# Patient Record
Sex: MALE | Race: ASIAN | NOT HISPANIC OR LATINO | Employment: UNEMPLOYED | URBAN - METROPOLITAN AREA
[De-identification: names, ages, dates, MRNs, and addresses within clinical notes are randomized per-mention and may not be internally consistent; named-entity substitution may affect disease eponyms.]

---

## 2020-02-03 ENCOUNTER — OFFICE VISIT (OUTPATIENT)
Dept: FAMILY MEDICINE CLINIC | Facility: CLINIC | Age: 30
End: 2020-02-03

## 2020-02-03 VITALS
HEART RATE: 94 BPM | WEIGHT: 166 LBS | BODY MASS INDEX: 23.77 KG/M2 | OXYGEN SATURATION: 99 % | DIASTOLIC BLOOD PRESSURE: 76 MMHG | SYSTOLIC BLOOD PRESSURE: 117 MMHG | HEIGHT: 70 IN

## 2020-02-03 DIAGNOSIS — R10.12 LEFT UPPER QUADRANT PAIN: Primary | ICD-10-CM

## 2020-02-03 DIAGNOSIS — Z13.6 SCREENING FOR CARDIOVASCULAR CONDITION: ICD-10-CM

## 2020-02-03 PROCEDURE — 99203 OFFICE O/P NEW LOW 30 MIN: CPT | Performed by: INTERNAL MEDICINE

## 2020-02-04 ENCOUNTER — LAB (OUTPATIENT)
Dept: LAB | Facility: HOSPITAL | Age: 30
End: 2020-02-04

## 2020-02-04 DIAGNOSIS — Z13.6 SCREENING FOR CARDIOVASCULAR CONDITION: ICD-10-CM

## 2020-02-04 DIAGNOSIS — R10.12 LEFT UPPER QUADRANT PAIN: ICD-10-CM

## 2020-02-04 LAB
ALBUMIN SERPL-MCNC: 4.4 G/DL (ref 3.5–5.2)
ALBUMIN/GLOB SERPL: 1.5 G/DL
ALP SERPL-CCNC: 55 U/L (ref 39–117)
ALT SERPL W P-5'-P-CCNC: 15 U/L (ref 1–41)
ANION GAP SERPL CALCULATED.3IONS-SCNC: 12.7 MMOL/L (ref 5–15)
AST SERPL-CCNC: 16 U/L (ref 1–40)
BASOPHILS # BLD AUTO: 0.05 10*3/MM3 (ref 0–0.2)
BASOPHILS NFR BLD AUTO: 0.9 % (ref 0–1.5)
BILIRUB SERPL-MCNC: 0.8 MG/DL (ref 0.2–1.2)
BUN BLD-MCNC: 14 MG/DL (ref 6–20)
BUN/CREAT SERPL: 13.9 (ref 7–25)
CALCIUM SPEC-SCNC: 9.5 MG/DL (ref 8.6–10.5)
CHLORIDE SERPL-SCNC: 101 MMOL/L (ref 98–107)
CHOLEST SERPL-MCNC: 152 MG/DL (ref 0–200)
CO2 SERPL-SCNC: 28.3 MMOL/L (ref 22–29)
CREAT BLD-MCNC: 1.01 MG/DL (ref 0.76–1.27)
DEPRECATED RDW RBC AUTO: 37.1 FL (ref 37–54)
EOSINOPHIL # BLD AUTO: 0.26 10*3/MM3 (ref 0–0.4)
EOSINOPHIL NFR BLD AUTO: 4.6 % (ref 0.3–6.2)
ERYTHROCYTE [DISTWIDTH] IN BLOOD BY AUTOMATED COUNT: 11.9 % (ref 12.3–15.4)
GFR SERPL CREATININE-BSD FRML MDRD: 106 ML/MIN/1.73
GFR SERPL CREATININE-BSD FRML MDRD: 87 ML/MIN/1.73
GLOBULIN UR ELPH-MCNC: 2.9 GM/DL
GLUCOSE BLD-MCNC: 79 MG/DL (ref 65–99)
HCT VFR BLD AUTO: 44.8 % (ref 37.5–51)
HDLC SERPL-MCNC: 36 MG/DL (ref 40–60)
HGB BLD-MCNC: 15.4 G/DL (ref 13–17.7)
IMM GRANULOCYTES # BLD AUTO: 0.01 10*3/MM3 (ref 0–0.05)
IMM GRANULOCYTES NFR BLD AUTO: 0.2 % (ref 0–0.5)
LDLC SERPL CALC-MCNC: 104 MG/DL (ref 0–100)
LDLC/HDLC SERPL: 2.89 {RATIO}
LIPASE SERPL-CCNC: 33 U/L (ref 13–60)
LYMPHOCYTES # BLD AUTO: 2.64 10*3/MM3 (ref 0.7–3.1)
LYMPHOCYTES NFR BLD AUTO: 46.3 % (ref 19.6–45.3)
MCH RBC QN AUTO: 29.3 PG (ref 26.6–33)
MCHC RBC AUTO-ENTMCNC: 34.4 G/DL (ref 31.5–35.7)
MCV RBC AUTO: 85.2 FL (ref 79–97)
MONOCYTES # BLD AUTO: 0.45 10*3/MM3 (ref 0.1–0.9)
MONOCYTES NFR BLD AUTO: 7.9 % (ref 5–12)
NEUTROPHILS # BLD AUTO: 2.29 10*3/MM3 (ref 1.7–7)
NEUTROPHILS NFR BLD AUTO: 40.1 % (ref 42.7–76)
NRBC BLD AUTO-RTO: 0 /100 WBC (ref 0–0.2)
PLATELET # BLD AUTO: 251 10*3/MM3 (ref 140–450)
PMV BLD AUTO: 10.1 FL (ref 6–12)
POTASSIUM BLD-SCNC: 4.3 MMOL/L (ref 3.5–5.2)
PROT SERPL-MCNC: 7.3 G/DL (ref 6–8.5)
RBC # BLD AUTO: 5.26 10*6/MM3 (ref 4.14–5.8)
SODIUM BLD-SCNC: 142 MMOL/L (ref 136–145)
TRIGL SERPL-MCNC: 60 MG/DL (ref 0–150)
VLDLC SERPL-MCNC: 12 MG/DL (ref 5–40)
WBC NRBC COR # BLD: 5.7 10*3/MM3 (ref 3.4–10.8)

## 2020-02-04 PROCEDURE — 83690 ASSAY OF LIPASE: CPT

## 2020-02-04 PROCEDURE — 80053 COMPREHEN METABOLIC PANEL: CPT

## 2020-02-04 PROCEDURE — 36415 COLL VENOUS BLD VENIPUNCTURE: CPT

## 2020-02-04 PROCEDURE — 83013 H PYLORI (C-13) BREATH: CPT

## 2020-02-04 PROCEDURE — 80061 LIPID PANEL: CPT

## 2020-02-04 PROCEDURE — 85025 COMPLETE CBC W/AUTO DIFF WBC: CPT

## 2020-02-04 NOTE — PROGRESS NOTES
Chief Complaint   Patient presents with   • GI Problem     sharp pain in abdomen x 1 month - LUQ . Has tried omeprazole - pain still comes back after eating.    • Establish Care     prefers prescriptions to be printed        HPI:  Raine Diana is a 29 y.o. male who presents today for left upper quadrant pain for the last month.  Pain started as a sharp stabbing sensation worse after meals left upper abdomen.  No changes in bowel movements.  He reports he started taking omeprazole daily which did significantly improve his symptoms.  He still intermittently has pain after some meals.  He is requesting an EGD today.  He denies any past medical history.    ROS:  Constitutional: no fevers, night sweats or unexplained weight loss  Eyes: no vision changes  ENT: no runny nose, ear pain, sore throat  Cardio: no chest pain, palpitations  Pulm: no shortness of breath, wheezing, or cough  GI: + abdominal pain - changes in bowel movements  : no difficulty urinating  MSK: no difficulty ambulating, no joint pain  Neuro: no weakness, dizziness or headache  Psych: no trouble sleeping  Endo: no change in appetite      History reviewed. No pertinent past medical history.   History reviewed. No pertinent family history.   Social History     Socioeconomic History   • Marital status: Single     Spouse name: Not on file   • Number of children: Not on file   • Years of education: Not on file   • Highest education level: Not on file   Tobacco Use   • Smoking status: Never Smoker   • Smokeless tobacco: Never Used   Substance and Sexual Activity   • Alcohol use: Not Currently   • Drug use: Never      No Known Allergies     There is no immunization history on file for this patient.     PE:  Vitals:    02/03/20 1531   BP: 117/76   Pulse: 94   SpO2: 99%      Body mass index is 23.82 kg/m².    Gen Appearance: NAD  HEENT: Normocephalic, PERRLA, no thyromegaly, trache midline  Heart: RRR, normal S1 and S2, no murmur  Lungs: CTA b/l, no wheezing, no  crackles  Abdomen: Soft, non-tender, non-distended, no guarding and BSx4  MSK: Moves all extremities well, normal gait, no peripheral edema  Pulses: Palpable and equal b/l  Lymph nodes: No palpable lymphadenopathy   Neuro: No focal deficits      No current outpatient medications on file.     No current facility-administered medications for this visit.         Raine was seen today for gi problem and establish care.    Diagnoses and all orders for this visit:    Left upper quadrant pain  -     CBC & Differential; Future  -     Comprehensive Metabolic Panel; Future  -     H. Pylori Breath Test - Breath, Lung; Future  -     Lipase; Future  -     Ambulatory referral for Screening EGD  New problem, H. pylori versus peptic ulcer.  Continue omeprazole.  Checking H. pylori today although false-negative rate will be high with concurrent PPI use.  Sending for EGD for further evaluation.  Checking screening blood work.  Screening for cardiovascular condition  -     Lipid Panel; Future         Return in about 4 weeks (around 3/2/2020) for Annual.     Please note that portions of this document were completed with a voice recognition program. Efforts were made to edit the dictations, but occasionally words are mis-transcribed.

## 2020-02-05 LAB — UREA BREATH TEST QL: POSITIVE

## 2020-02-06 ENCOUNTER — TELEPHONE (OUTPATIENT)
Dept: FAMILY MEDICINE CLINIC | Facility: CLINIC | Age: 30
End: 2020-02-06

## 2020-02-06 DIAGNOSIS — A04.8 H. PYLORI INFECTION: Primary | ICD-10-CM

## 2020-02-06 RX ORDER — METRONIDAZOLE 500 MG/1
500 TABLET ORAL 3 TIMES DAILY
Qty: 42 TABLET | Refills: 0 | Status: SHIPPED | OUTPATIENT
Start: 2020-02-06 | End: 2020-02-20

## 2020-02-06 RX ORDER — DOXYCYCLINE HYCLATE 100 MG
100 TABLET ORAL 2 TIMES DAILY
Qty: 28 TABLET | Refills: 0 | Status: SHIPPED | OUTPATIENT
Start: 2020-02-06 | End: 2020-02-20

## 2020-02-06 RX ORDER — PANTOPRAZOLE SODIUM 40 MG/1
40 TABLET, DELAYED RELEASE ORAL 2 TIMES DAILY
Qty: 28 TABLET | Refills: 0 | Status: SHIPPED | OUTPATIENT
Start: 2020-02-06 | End: 2020-02-20

## 2020-02-06 NOTE — TELEPHONE ENCOUNTER
Called patient, no answer, left voicemail.  He is positive for H. pylori.  He has several new prescriptions he needs to come .  We will need to postpone EGD until after treatment.

## 2020-02-07 NOTE — TELEPHONE ENCOUNTER
Notified patient of his lab results and explained he has some scripts to  for the h pylori infection. The patient verbalized understanding and will come by to pick these up.

## 2020-02-07 NOTE — TELEPHONE ENCOUNTER
Yakov Garcia RegSched Rep 23 hours ago (1:09 PM)         PT CALLED IN REQUESTING A CB TO DISCUSS TEST RESULTS        PT CB NUMBER: [id number]

## 2020-02-25 ENCOUNTER — TELEPHONE (OUTPATIENT)
Dept: FAMILY MEDICINE CLINIC | Facility: CLINIC | Age: 30
End: 2020-02-25

## 2020-02-25 ENCOUNTER — OFFICE VISIT (OUTPATIENT)
Dept: FAMILY MEDICINE CLINIC | Facility: CLINIC | Age: 30
End: 2020-02-25

## 2020-02-25 VITALS
OXYGEN SATURATION: 98 % | SYSTOLIC BLOOD PRESSURE: 120 MMHG | DIASTOLIC BLOOD PRESSURE: 66 MMHG | BODY MASS INDEX: 23.77 KG/M2 | WEIGHT: 166 LBS | HEIGHT: 70 IN | HEART RATE: 68 BPM | TEMPERATURE: 98 F

## 2020-02-25 DIAGNOSIS — K21.9 GASTROESOPHAGEAL REFLUX DISEASE, ESOPHAGITIS PRESENCE NOT SPECIFIED: ICD-10-CM

## 2020-02-25 DIAGNOSIS — A04.8 H. PYLORI INFECTION: ICD-10-CM

## 2020-02-25 DIAGNOSIS — B37.0 THRUSH: ICD-10-CM

## 2020-02-25 DIAGNOSIS — Z20.828 EXPOSURE TO THE FLU: Primary | ICD-10-CM

## 2020-02-25 PROCEDURE — 99214 OFFICE O/P EST MOD 30 MIN: CPT | Performed by: INTERNAL MEDICINE

## 2020-02-25 RX ORDER — FLUCONAZOLE 100 MG/1
100 TABLET ORAL DAILY
Qty: 11 TABLET | Refills: 0 | Status: SHIPPED | OUTPATIENT
Start: 2020-02-25 | End: 2020-03-06

## 2020-02-25 RX ORDER — OMEPRAZOLE 20 MG/1
20 CAPSULE, DELAYED RELEASE ORAL DAILY
Qty: 30 CAPSULE | Refills: 2 | Status: SHIPPED | OUTPATIENT
Start: 2020-02-25 | End: 2020-05-26

## 2020-02-25 RX ORDER — OSELTAMIVIR PHOSPHATE 75 MG/1
75 CAPSULE ORAL DAILY
Qty: 7 CAPSULE | Refills: 0 | Status: SHIPPED | OUTPATIENT
Start: 2020-02-25 | End: 2020-03-03

## 2020-02-25 NOTE — TELEPHONE ENCOUNTER
[facility] pharmacy is requesting the directions for the Tamiflu are correct.  She says it says 1 capsule daily for 7 days.  She said they normally see it as 1 capsule twice a day for 10 days.  They are requesting call back for confirmation.

## 2020-02-25 NOTE — PROGRESS NOTES
Chief Complaint   Patient presents with   • Oral Pain     discoloration of tongue    • Influenza     Exposed to flu        HPI:  Raine Diana is a 29 y.o. male who presents today for follow-up.  H. pylori test patient reports he feels much better after taking antibiotics and additional medications.  He reports his GERD symptoms have returned after stopping antacid.  He reports daily symptoms at this time.  He reports a white film over his tongue after taking antibiotics.  He states he has lost some weight since being diagnosed with H. pylori.  He reports a 10 pound weight loss over the last month.  He was exposed to somebody with the flu recently although he does not have symptoms at this time.    ROS:  Constitutional: no fevers, night sweats or unexplained weight loss  Eyes: no vision changes  ENT: no runny nose, ear pain, sore throat  Cardio: no chest pain, palpitations  Pulm: no shortness of breath, wheezing, or cough  GI: no abdominal pain or changes in bowel movements  : no difficulty urinating  MSK: no difficulty ambulating, no joint pain  Neuro: no weakness, dizziness or headache  Psych: no trouble sleeping  Endo: no change in appetite      History reviewed. No pertinent past medical history.   History reviewed. No pertinent family history.   Social History     Socioeconomic History   • Marital status: Single     Spouse name: Not on file   • Number of children: Not on file   • Years of education: Not on file   • Highest education level: Not on file   Tobacco Use   • Smoking status: Never Smoker   • Smokeless tobacco: Never Used   Substance and Sexual Activity   • Alcohol use: Not Currently   • Drug use: Never      No Known Allergies     There is no immunization history on file for this patient.     PE:  Vitals:    02/25/20 1347   BP: 120/66   Pulse: 68   Temp: 98 °F (36.7 °C)   SpO2: 98%      Body mass index is 23.82 kg/m².    Gen Appearance: NAD  HEENT: Normocephalic, PERRLA, no thyromegaly, trache  midline  Heart: RRR, normal S1 and S2, no murmur  Lungs: CTA b/l, no wheezing, no crackles  Abdomen: Soft, non-tender, non-distended, no guarding and BSx4  MSK: Moves all extremities well, normal gait, no peripheral edema  Pulses: Palpable and equal b/l  Lymph nodes: No palpable lymphadenopathy   Neuro: No focal deficits      Current Outpatient Medications   Medication Sig Dispense Refill   • fluconazole (DIFLUCAN) 100 MG tablet Take 1 tablet by mouth Daily for 10 days. Take 2 tabs on the first day. 11 tablet 0   • omeprazole (PrilOSEC) 20 MG capsule Take 1 capsule by mouth Daily. 30 capsule 2   • oseltamivir (TAMIFLU) 75 MG capsule Take 1 capsule by mouth Daily for 7 days. 7 capsule 0     No current facility-administered medications for this visit.         Raine was seen today for oral pain and influenza.    Diagnoses and all orders for this visit:    Exposure to the flu  Sending in prophylactic dose Tamiflu.  Thrush  Fluconazole for the next 7 days.  H. pylori infection  Sending for EGD for further evaluation and biopsy results.  Discussed with patient that he could repeat H. pylori breath test after stopping omeprazole for 1 to 2 weeks.  He would like to proceed with EGD.  He does have concurrent weight loss although I suspect this is due to upset stomach and not eating as much.  Gastroesophageal reflux disease, esophagitis presence not specified  Resume omeprazole daily.  Send in refill today.  Other orders  -     fluconazole (DIFLUCAN) 100 MG tablet; Take 1 tablet by mouth Daily for 10 days. Take 2 tabs on the first day.  -     omeprazole (PrilOSEC) 20 MG capsule; Take 1 capsule by mouth Daily.  -     oseltamivir (TAMIFLU) 75 MG capsule; Take 1 capsule by mouth Daily for 7 days.         Return in about 1 year (around 2/25/2021) for Annual.     Please note that portions of this document were completed with a voice recognition program. Efforts were made to edit the dictations, but occasionally words are  mis-transcribed.

## 2020-04-03 DIAGNOSIS — A04.8 H. PYLORI INFECTION: Primary | ICD-10-CM

## 2020-04-09 ENCOUNTER — TELEMEDICINE (OUTPATIENT)
Dept: FAMILY MEDICINE CLINIC | Facility: CLINIC | Age: 30
End: 2020-04-09

## 2020-04-09 DIAGNOSIS — K59.03 DRUG INDUCED CONSTIPATION: ICD-10-CM

## 2020-04-09 DIAGNOSIS — K21.9 GASTROESOPHAGEAL REFLUX DISEASE, ESOPHAGITIS PRESENCE NOT SPECIFIED: Primary | ICD-10-CM

## 2020-04-09 DIAGNOSIS — Z86.19 HISTORY OF HELICOBACTER PYLORI INFECTION: ICD-10-CM

## 2020-04-09 DIAGNOSIS — K62.5 BRIGHT RED BLOOD PER RECTUM: ICD-10-CM

## 2020-04-09 PROCEDURE — 99214 OFFICE O/P EST MOD 30 MIN: CPT | Performed by: INTERNAL MEDICINE

## 2020-04-09 NOTE — PROGRESS NOTES
gerd      HPI:  Raine Diana is a 29 y.o. male who presents today for continued reflux symptoms.  Patient reports he stopped taking his antiacid medicine about a week ago and symptoms returned.  He was also taking supplemental vitamins which was causing worsening reflux.  He was fearful that the H. pylori has returned.  He resumed medication and symptoms have somewhat improved.  He is fearful of coming in to repeat the H. pylori breath test.  He reports the medication has caused constipation.  He has had some blood in stool over the last month.    ROS:  Constitutional: no fevers, night sweats or unexplained weight loss  Eyes: no vision changes  ENT: no runny nose, ear pain, sore throat  Cardio: no chest pain, palpitations  Pulm: no shortness of breath, wheezing, or cough  GI: + abdominal pain + changes in bowel movements  : no difficulty urinating  MSK: no difficulty ambulating, no joint pain  Neuro: no weakness, dizziness or headache  Psych: no trouble sleeping  Endo: no change in appetite      No past medical history on file.   No family history on file.   Social History     Socioeconomic History   • Marital status: Single     Spouse name: Not on file   • Number of children: Not on file   • Years of education: Not on file   • Highest education level: Not on file   Tobacco Use   • Smoking status: Never Smoker   • Smokeless tobacco: Never Used   Substance and Sexual Activity   • Alcohol use: Not Currently   • Drug use: Never      No Known Allergies     There is no immunization history on file for this patient.     PE:  There were no vitals filed for this visit.   There is no height or weight on file to calculate BMI.    Gen Appearance: NAD  HEENT: Normocephalic  MSK: Moves all extremities well, normal gait, no peripheral edema  Neuro: No focal deficits      Current Outpatient Medications   Medication Sig Dispense Refill   • omeprazole (PrilOSEC) 20 MG capsule Take 1 capsule by mouth Daily. 30 capsule 2     No  current facility-administered medications for this visit.         Diagnoses and all orders for this visit:    Gastroesophageal reflux disease, esophagitis presence not specified  Recommend continuing omeprazole.  Will need EGD if symptoms persist and for retesting purposes of H. pylori.  Symptoms are improving since restarting medicine.  History of Helicobacter pylori infection  See above.  Will need repeat testing.  Bright red blood per rectum  Possibly due to constipation.  Will work-up with scope if needed more persistent.  Unable to get in for routine scopes at this time due to coronavirus.  Drug-induced constipation  Recommend increasing water and fiber intake.  Docusate as needed.  Will need to continue antiacid at this time due to history of H. pylori and persistent symptoms.  No follow-ups on file.     Please note that portions of this document were completed with a voice recognition program. Efforts were made to edit the dictations, but occasionally words are mis-transcribed.

## 2020-04-23 ENCOUNTER — TELEMEDICINE (OUTPATIENT)
Dept: FAMILY MEDICINE CLINIC | Facility: CLINIC | Age: 30
End: 2020-04-23

## 2020-04-23 DIAGNOSIS — K59.03 DRUG-INDUCED CONSTIPATION: ICD-10-CM

## 2020-04-23 DIAGNOSIS — K27.9 PEPTIC ULCER DISEASE: ICD-10-CM

## 2020-04-23 DIAGNOSIS — A04.8 H. PYLORI INFECTION: Primary | ICD-10-CM

## 2020-04-23 PROCEDURE — 99214 OFFICE O/P EST MOD 30 MIN: CPT | Performed by: INTERNAL MEDICINE

## 2020-04-23 RX ORDER — SENNA AND DOCUSATE SODIUM 50; 8.6 MG/1; MG/1
1 TABLET, FILM COATED ORAL 2 TIMES DAILY
Qty: 60 TABLET | Refills: 2 | Status: SHIPPED | OUTPATIENT
Start: 2020-04-23

## 2020-04-23 RX ORDER — FAMOTIDINE 20 MG/1
20 TABLET, FILM COATED ORAL 2 TIMES DAILY
Qty: 60 TABLET | Refills: 2 | Status: SHIPPED | OUTPATIENT
Start: 2020-04-23 | End: 2020-04-23 | Stop reason: SDUPTHER

## 2020-04-23 RX ORDER — SENNA AND DOCUSATE SODIUM 50; 8.6 MG/1; MG/1
1 TABLET, FILM COATED ORAL 2 TIMES DAILY
Qty: 60 TABLET | Refills: 2 | Status: SHIPPED | OUTPATIENT
Start: 2020-04-23 | End: 2020-04-23 | Stop reason: SDUPTHER

## 2020-04-23 RX ORDER — FAMOTIDINE 20 MG/1
20 TABLET, FILM COATED ORAL 2 TIMES DAILY
Qty: 60 TABLET | Refills: 2 | Status: SHIPPED | OUTPATIENT
Start: 2020-04-23

## 2020-04-23 NOTE — PROGRESS NOTES
CC: abdominal pain  You have chosen to receive care through a telehealth visit.  Do you consent to use a video/audio connection for your medical care today? Yes      HPI:  Quang Diana is a 29 y.o. male who presents today for continued abdominal pain, now radiating to his back. He is anxious because he is unable to get an EGD right now due to coronavirus. He stopped taking omeprazole recently because of constipation. He was taking dulcolax for constipation but this was ineffective.     ROS:  Constitutional: no fevers, night sweats or unexplained weight loss  Eyes: no vision changes  ENT: no runny nose, ear pain, sore throat  Cardio: no chest pain, palpitations  Pulm: no shortness of breath, wheezing, or cough  GI: +abdominal pain +changes in bowel movements  : no difficulty urinating  MSK: no difficulty ambulating, no joint pain  Neuro: no weakness, dizziness or headache  Psych: no trouble sleeping  Endo: no change in appetite      History reviewed. No pertinent past medical history.   History reviewed. No pertinent family history.   Social History     Socioeconomic History   • Marital status: Single     Spouse name: Not on file   • Number of children: Not on file   • Years of education: Not on file   • Highest education level: Not on file   Tobacco Use   • Smoking status: Never Smoker   • Smokeless tobacco: Never Used   Substance and Sexual Activity   • Alcohol use: Not Currently   • Drug use: Never      No Known Allergies     There is no immunization history on file for this patient.     PE:  There were no vitals filed for this visit.   There is no height or weight on file to calculate BMI.    Gen Appearance: NAD  HEENT: Normocephalic  MSK: Moves all extremities well, normal gait, no peripheral edema  Neuro: No focal deficits      Current Outpatient Medications   Medication Sig Dispense Refill   • omeprazole (PrilOSEC) 20 MG capsule Take 1 capsule by mouth Daily. 30 capsule 2   • famotidine (Pepcid) 20 MG tablet  Take 1 tablet by mouth 2 (Two) Times a Day. 60 tablet 2   • senna-docusate sodium (SENOKOT-S) 8.6-50 MG tablet Take 1 tablet by mouth 2 (Two) Times a Day. 60 tablet 2     No current facility-administered medications for this visit.       10 min spent discussing with patient on video visit.      Diagnoses and all orders for this visit:    H. pylori infection  -     Comprehensive Metabolic Panel; Future  -     Lipase; Future  -     CBC & Differential; Future  Recommend repeating H pylori testing. He completed quadruple therapy, but had persistent symptoms afterwards. High likelihood of false negative with concurrent PPI use. Will ultimately need EGD.  Peptic ulcer disease  -     Comprehensive Metabolic Panel; Future  -     Lipase; Future  -     CBC & Differential; Future  Would recommend resuming omeprazole, add on docusate senna daily for constipation. If unable to tolerate will step down to pepcid.  Drug-induced constipation  See above.   Other orders  -     Discontinue: famotidine (Pepcid) 20 MG tablet; Take 1 tablet by mouth 2 (Two) Times a Day.  -     Discontinue: senna-docusate sodium (SENOKOT-S) 8.6-50 MG tablet; Take 1 tablet by mouth 2 (Two) Times a Day.         No follow-ups on file.     Please note that portions of this document were completed with a voice recognition program. Efforts were made to edit the dictations, but occasionally words are mis-transcribed.

## 2020-04-24 ENCOUNTER — LAB (OUTPATIENT)
Dept: LAB | Facility: HOSPITAL | Age: 30
End: 2020-04-24

## 2020-04-24 DIAGNOSIS — K27.9 PEPTIC ULCER DISEASE: ICD-10-CM

## 2020-04-24 DIAGNOSIS — A04.8 H. PYLORI INFECTION: ICD-10-CM

## 2020-04-24 LAB
ALBUMIN SERPL-MCNC: 4.5 G/DL (ref 3.5–5.2)
ALBUMIN/GLOB SERPL: 1.3 G/DL
ALP SERPL-CCNC: 62 U/L (ref 39–117)
ALT SERPL W P-5'-P-CCNC: 39 U/L (ref 1–41)
ANION GAP SERPL CALCULATED.3IONS-SCNC: 13.6 MMOL/L (ref 5–15)
AST SERPL-CCNC: 19 U/L (ref 1–40)
BASOPHILS # BLD AUTO: 0.02 10*3/MM3 (ref 0–0.2)
BASOPHILS NFR BLD AUTO: 0.4 % (ref 0–1.5)
BILIRUB SERPL-MCNC: 1.1 MG/DL (ref 0.2–1.2)
BUN BLD-MCNC: 15 MG/DL (ref 6–20)
BUN/CREAT SERPL: 17 (ref 7–25)
CALCIUM SPEC-SCNC: 9.5 MG/DL (ref 8.6–10.5)
CHLORIDE SERPL-SCNC: 98 MMOL/L (ref 98–107)
CO2 SERPL-SCNC: 26.4 MMOL/L (ref 22–29)
CREAT BLD-MCNC: 0.88 MG/DL (ref 0.76–1.27)
DEPRECATED RDW RBC AUTO: 39.1 FL (ref 37–54)
EOSINOPHIL # BLD AUTO: 0.24 10*3/MM3 (ref 0–0.4)
EOSINOPHIL NFR BLD AUTO: 4.5 % (ref 0.3–6.2)
ERYTHROCYTE [DISTWIDTH] IN BLOOD BY AUTOMATED COUNT: 12.3 % (ref 12.3–15.4)
GFR SERPL CREATININE-BSD FRML MDRD: 102 ML/MIN/1.73
GFR SERPL CREATININE-BSD FRML MDRD: 124 ML/MIN/1.73
GLOBULIN UR ELPH-MCNC: 3.4 GM/DL
GLUCOSE BLD-MCNC: 84 MG/DL (ref 65–99)
HCT VFR BLD AUTO: 46.2 % (ref 37.5–51)
HGB BLD-MCNC: 15.4 G/DL (ref 13–17.7)
IMM GRANULOCYTES # BLD AUTO: 0 10*3/MM3 (ref 0–0.05)
IMM GRANULOCYTES NFR BLD AUTO: 0 % (ref 0–0.5)
LIPASE SERPL-CCNC: 31 U/L (ref 13–60)
LYMPHOCYTES # BLD AUTO: 2.45 10*3/MM3 (ref 0.7–3.1)
LYMPHOCYTES NFR BLD AUTO: 46.3 % (ref 19.6–45.3)
MCH RBC QN AUTO: 28.9 PG (ref 26.6–33)
MCHC RBC AUTO-ENTMCNC: 33.3 G/DL (ref 31.5–35.7)
MCV RBC AUTO: 86.7 FL (ref 79–97)
MONOCYTES # BLD AUTO: 0.39 10*3/MM3 (ref 0.1–0.9)
MONOCYTES NFR BLD AUTO: 7.4 % (ref 5–12)
NEUTROPHILS # BLD AUTO: 2.19 10*3/MM3 (ref 1.7–7)
NEUTROPHILS NFR BLD AUTO: 41.4 % (ref 42.7–76)
NRBC BLD AUTO-RTO: 0.2 /100 WBC (ref 0–0.2)
PLATELET # BLD AUTO: 252 10*3/MM3 (ref 140–450)
PMV BLD AUTO: 10.3 FL (ref 6–12)
POTASSIUM BLD-SCNC: 3.8 MMOL/L (ref 3.5–5.2)
PROT SERPL-MCNC: 7.9 G/DL (ref 6–8.5)
RBC # BLD AUTO: 5.33 10*6/MM3 (ref 4.14–5.8)
SODIUM BLD-SCNC: 138 MMOL/L (ref 136–145)
WBC NRBC COR # BLD: 5.29 10*3/MM3 (ref 3.4–10.8)

## 2020-04-24 PROCEDURE — 83013 H PYLORI (C-13) BREATH: CPT

## 2020-04-24 PROCEDURE — 80053 COMPREHEN METABOLIC PANEL: CPT

## 2020-04-24 PROCEDURE — 83690 ASSAY OF LIPASE: CPT

## 2020-04-24 PROCEDURE — 85025 COMPLETE CBC W/AUTO DIFF WBC: CPT

## 2020-04-25 LAB — UREA BREATH TEST QL: NEGATIVE

## 2020-05-04 ENCOUNTER — TELEMEDICINE (OUTPATIENT)
Dept: GASTROENTEROLOGY | Facility: CLINIC | Age: 30
End: 2020-05-04

## 2020-05-04 DIAGNOSIS — R10.13 DYSPEPSIA: ICD-10-CM

## 2020-05-04 DIAGNOSIS — R10.13 EPIGASTRIC PAIN: Primary | ICD-10-CM

## 2020-05-04 DIAGNOSIS — K29.70 HELICOBACTER PYLORI GASTRITIS: ICD-10-CM

## 2020-05-04 DIAGNOSIS — B96.81 HELICOBACTER PYLORI GASTRITIS: ICD-10-CM

## 2020-05-04 PROCEDURE — 99203 OFFICE O/P NEW LOW 30 MIN: CPT | Performed by: INTERNAL MEDICINE

## 2020-05-04 NOTE — PROGRESS NOTES
PCP: Braulio Garcia DO    No chief complaint on file.  History of H. pylori.  History of upper abdominal pain.    History of Present Illness:   HPI  This was a video visit.  Patient consented for video visit.  I appreciate this consult for upper abdominal pain and history of H. Pylori.  Mr. Diana is a 29-year-old with a history of H. pylori diagnosed back in February.  This was diagnosed with a breath test and the patient took medication with resolution of his symptoms.  He was experiencing that time some upper abdominal pain that was described as burning without radiation.  Mr. Diana states the pain did increase with meals.  There was no vomiting but he did have some nausea.  The patient initially lost about 20 pounds per report.  There is no history of difficult or painful swallowing.  The patient denies any breakthrough heartburn.  The symptoms began in January prior to being treated in February.  He began having some symptoms again in early April.  The patient stopped the omeprazole medication because it was causing some constipation.  He did however do another breath test that was negative.  Mr. Diana admits that the test was performed while he was on omeprazole.  Mr. Diana states that over the last couple of weeks he has not experienced any abdominal pain after starting the omeprazole again.  The patient has regular bowel habits without signs of blood in the stool.  He denies any difficult or painful evacuation.  There is no history of change in the caliber stool.  Mr. Diana states there is no history in the family of gastrointestinal cancer.  There is no known history of liver disease.  He denies taking any nonsteroidal medication.  The patient does not smoke cigarettes.  No past medical history on file.    Past Surgical History:   Procedure Laterality Date   • HAND SURGERY Right 2019         Current Outpatient Medications:   •  famotidine (Pepcid) 20 MG tablet, Take 1 tablet by mouth 2 (Two) Times a  Day., Disp: 60 tablet, Rfl: 2  •  omeprazole (PrilOSEC) 20 MG capsule, Take 1 capsule by mouth Daily., Disp: 30 capsule, Rfl: 2  •  senna-docusate sodium (SENOKOT-S) 8.6-50 MG tablet, Take 1 tablet by mouth 2 (Two) Times a Day., Disp: 60 tablet, Rfl: 2    No Known Allergies    No family history on file.    Social History     Socioeconomic History   • Marital status: Single     Spouse name: Not on file   • Number of children: Not on file   • Years of education: Not on file   • Highest education level: Not on file   Tobacco Use   • Smoking status: Never Smoker   • Smokeless tobacco: Never Used   Substance and Sexual Activity   • Alcohol use: Not Currently   • Drug use: Never       Review of Systems   Constitutional: Negative for appetite change, fatigue, fever and unexpected weight change.   HENT: Negative for dental problem, mouth sores, postnasal drip, sneezing, trouble swallowing and voice change.    Eyes: Negative for pain, redness and itching.   Respiratory: Negative for cough, shortness of breath and wheezing.    Cardiovascular: Negative for chest pain, palpitations and leg swelling.   Gastrointestinal: Positive for abdominal pain. Negative for abdominal distention, anal bleeding, blood in stool, constipation, diarrhea, nausea, rectal pain and vomiting.   Endocrine: Negative for cold intolerance, heat intolerance, polydipsia and polyuria.   Genitourinary: Negative for dysuria, enuresis, flank pain, hematuria and urgency.   Musculoskeletal: Negative for arthralgias, back pain, joint swelling and myalgias.   Skin: Negative for color change, pallor and rash.   Allergic/Immunologic: Negative for environmental allergies, food allergies and immunocompromised state.   Neurological: Negative for dizziness, tremors, seizures, facial asymmetry, numbness and headaches.   Psychiatric/Behavioral: Negative for behavioral problems, dysphoric mood, hallucinations and self-injury.       There were no vitals filed for this  visit.    Physical Exam    Diagnoses and all orders for this visit:    Epigastric pain    Dyspepsia    Helicobacter pylori gastritis    The patient reports improved symptoms since beginning omeprazole again.  The recent breath test was negative but he was taking omeprazole at that juncture.  Initially, there was some weight loss report.  The patient denies any further weight loss and states his appetite has improved.  Will investigate further to rule out any mucosal pathology given history of Helicobacter pylori and increased risk for gastric cancer.      Plan: Will proceed with an EGD for further evaluation.

## 2020-05-26 RX ORDER — OMEPRAZOLE 20 MG/1
CAPSULE, DELAYED RELEASE ORAL
Qty: 30 CAPSULE | Refills: 2 | Status: SHIPPED | OUTPATIENT
Start: 2020-05-26

## 2020-05-29 ENCOUNTER — APPOINTMENT (OUTPATIENT)
Dept: PREADMISSION TESTING | Facility: HOSPITAL | Age: 30
End: 2020-05-29

## 2020-05-29 PROCEDURE — U0004 COV-19 TEST NON-CDC HGH THRU: HCPCS

## 2020-05-29 PROCEDURE — C9803 HOPD COVID-19 SPEC COLLECT: HCPCS

## 2020-05-29 PROCEDURE — U0002 COVID-19 LAB TEST NON-CDC: HCPCS

## 2020-05-31 LAB
REF LAB TEST METHOD: NORMAL
SARS-COV-2 RNA RESP QL NAA+PROBE: NOT DETECTED

## 2020-06-01 ENCOUNTER — OUTSIDE FACILITY SERVICE (OUTPATIENT)
Dept: GASTROENTEROLOGY | Facility: CLINIC | Age: 30
End: 2020-06-01

## 2020-06-01 ENCOUNTER — LAB REQUISITION (OUTPATIENT)
Dept: LAB | Facility: HOSPITAL | Age: 30
End: 2020-06-01

## 2020-06-01 DIAGNOSIS — R10.13 EPIGASTRIC PAIN: ICD-10-CM

## 2020-06-01 DIAGNOSIS — K30 FUNCTIONAL DYSPEPSIA: ICD-10-CM

## 2020-06-01 PROCEDURE — 88342 IMHCHEM/IMCYTCHM 1ST ANTB: CPT | Performed by: INTERNAL MEDICINE

## 2020-06-01 PROCEDURE — 88305 TISSUE EXAM BY PATHOLOGIST: CPT | Performed by: INTERNAL MEDICINE

## 2020-06-01 PROCEDURE — 43239 EGD BIOPSY SINGLE/MULTIPLE: CPT | Performed by: INTERNAL MEDICINE

## 2020-06-03 LAB
CYTO UR: NORMAL
DX PRELIMINARY: NORMAL
LAB AP CASE REPORT: NORMAL
LAB AP CLINICAL INFORMATION: NORMAL
PATH REPORT.FINAL DX SPEC: NORMAL
PATH REPORT.GROSS SPEC: NORMAL

## 2020-07-01 ENCOUNTER — OFFICE VISIT (OUTPATIENT)
Dept: FAMILY MEDICINE CLINIC | Facility: CLINIC | Age: 30
End: 2020-07-01

## 2020-07-01 VITALS
HEART RATE: 66 BPM | WEIGHT: 164.8 LBS | DIASTOLIC BLOOD PRESSURE: 70 MMHG | SYSTOLIC BLOOD PRESSURE: 112 MMHG | HEIGHT: 70 IN | OXYGEN SATURATION: 96 % | BODY MASS INDEX: 23.59 KG/M2

## 2020-07-01 DIAGNOSIS — R10.11 RIGHT UPPER QUADRANT PAIN: Primary | ICD-10-CM

## 2020-07-01 DIAGNOSIS — K59.03 DRUG-INDUCED CONSTIPATION: ICD-10-CM

## 2020-07-01 DIAGNOSIS — K29.50 OTHER CHRONIC GASTRITIS WITHOUT HEMORRHAGE: ICD-10-CM

## 2020-07-01 PROCEDURE — 99214 OFFICE O/P EST MOD 30 MIN: CPT | Performed by: INTERNAL MEDICINE

## 2020-07-01 NOTE — PROGRESS NOTES
Chief Complaint   Patient presents with   • Abdominal Pain     right sided, noticed the pain in middle of May.       HPI:  Quang Diana is a 29 y.o. male who presents today for right-sided abdominal pain.  Patient reports this woke him from sleeping a few weeks back.  He reports the pain was 8 out of 10 at that time.  Self resolved after 2 hours.  He has had intermittent lower intensity pain on the right abdomen since then.  Cannot relate pain to food intake or anytime a day.  Unable to identify any specific triggers.  Recurring pain is now 4-5 out of 10.  He reports he is no longer taking antacids due to constipation.  No change in bowel movements.  Pain does not radiate to back.  Denies any fevers or chills.    ROS:  Constitutional: no fevers, night sweats or unexplained weight loss  Eyes: no vision changes  ENT: no runny nose, ear pain, sore throat  Cardio: no chest pain, palpitations  Pulm: no shortness of breath, wheezing, or cough  GI: + abdominal pain + changes in bowel movements  : no difficulty urinating  MSK: no difficulty ambulating, no joint pain  Neuro: no weakness, dizziness or headache  Psych: no trouble sleeping  Endo: no change in appetite      History reviewed. No pertinent past medical history.   History reviewed. No pertinent family history.   Social History     Socioeconomic History   • Marital status: Single     Spouse name: Not on file   • Number of children: Not on file   • Years of education: Not on file   • Highest education level: Not on file   Tobacco Use   • Smoking status: Never Smoker   • Smokeless tobacco: Never Used   Substance and Sexual Activity   • Alcohol use: Not Currently   • Drug use: Never      No Known Allergies     There is no immunization history on file for this patient.     PE:  Vitals:    07/01/20 1330   BP: 112/70   Pulse: 66   SpO2: 96%      Body mass index is 23.65 kg/m².    Gen Appearance: NAD  HEENT: Normocephalic, PERRLA, no thyromegaly, trache midline  Heart: RRR,  normal S1 and S2, no murmur  Lungs: CTA b/l, no wheezing, no crackles  Abdomen: Soft, non-tender, non-distended, no guarding and BSx4  MSK: Moves all extremities well, normal gait, no peripheral edema  Pulses: Palpable and equal b/l  Lymph nodes: No palpable lymphadenopathy   Neuro: No focal deficits      Current Outpatient Medications   Medication Sig Dispense Refill   • famotidine (Pepcid) 20 MG tablet Take 1 tablet by mouth 2 (Two) Times a Day. 60 tablet 2   • omeprazole (priLOSEC) 20 MG capsule TAKE 1 CAPSULE BY MOUTH EVERY DAY 30 capsule 2   • senna-docusate sodium (SENOKOT-S) 8.6-50 MG tablet Take 1 tablet by mouth 2 (Two) Times a Day. 60 tablet 2     No current facility-administered medications for this visit.         Quang was seen today for abdominal pain.    Diagnoses and all orders for this visit:    Right upper quadrant pain  -     US Gallbladder; Future  No pain on exam today.  Will evaluate gallbladder additionally.  Other chronic gastritis without hemorrhage  Patient does not want to take any antacids due to constipation.  No abdominal pain at the moment.  Denies any reflux.  Drug-induced constipation  See above.       No follow-ups on file.     Please note that portions of this document were completed with a voice recognition program. Efforts were made to edit the dictations, but occasionally words are mis-transcribed.  Answers for HPI/ROS submitted by the patient on 7/1/2020   Abdominal pain  What is the primary reason for your visit?: Abdominal Pain  Chronicity: new  Onset: more than 1 month ago  Onset quality: gradual  Frequency: daily  Episode duration: 3 hours  Progression since onset: waxing and waning  Pain location: RLQ, RUQ  Pain - numeric: 4/10  Pain quality: aching  Radiates to: RLQ, RUQ  anorexia: No  arthralgias: Yes  belching: No  constipation: No  diarrhea: No  dysuria: No  fever: No  flatus: No  frequency: No  headaches: No  hematochezia: No  hematuria: No  melena: No  myalgias:  No  nausea: No  weight loss: No  vomiting: No  Aggravated by: nothing  Relieved by: nothing  Diagnostic workup: upper endoscopy

## 2020-07-08 ENCOUNTER — HOSPITAL ENCOUNTER (OUTPATIENT)
Dept: ULTRASOUND IMAGING | Facility: HOSPITAL | Age: 30
Discharge: HOME | End: 2020-07-08
Admitting: INTERNAL MEDICINE

## 2020-07-08 DIAGNOSIS — R10.11 RIGHT UPPER QUADRANT PAIN: ICD-10-CM

## 2020-07-08 PROCEDURE — 76705 ECHO EXAM OF ABDOMEN: CPT

## 2020-08-12 ENCOUNTER — TELEPHONE (OUTPATIENT)
Dept: GASTROENTEROLOGY | Facility: CLINIC | Age: 30
End: 2020-08-12

## 2020-08-12 DIAGNOSIS — R10.13 DYSPEPSIA: ICD-10-CM

## 2020-08-12 DIAGNOSIS — R10.13 EPIGASTRIC PAIN: Primary | ICD-10-CM

## 2020-08-12 NOTE — TELEPHONE ENCOUNTER
"Patient called in to schedule an appt with you for \"belly discomfort\" coming from his \"red belly\".  He had an EGD in June and you recommended further gallbladder studies if he was still experiencing issues.  Please advise.    "